# Patient Record
Sex: MALE | Race: BLACK OR AFRICAN AMERICAN | NOT HISPANIC OR LATINO | URBAN - METROPOLITAN AREA
[De-identification: names, ages, dates, MRNs, and addresses within clinical notes are randomized per-mention and may not be internally consistent; named-entity substitution may affect disease eponyms.]

---

## 2022-04-03 ENCOUNTER — EMERGENCY (EMERGENCY)
Facility: HOSPITAL | Age: 32
LOS: 0 days | Discharge: HOME | End: 2022-04-03
Payer: MEDICAID

## 2022-04-03 VITALS
OXYGEN SATURATION: 100 % | HEIGHT: 77 IN | DIASTOLIC BLOOD PRESSURE: 70 MMHG | RESPIRATION RATE: 19 BRPM | WEIGHT: 201.94 LBS | SYSTOLIC BLOOD PRESSURE: 116 MMHG | TEMPERATURE: 98 F | HEART RATE: 56 BPM
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
## 2022-04-03 DIAGNOSIS — S60.311A ABRASION OF RIGHT THUMB, INITIAL ENCOUNTER: ICD-10-CM
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
## 2022-04-03 PROCEDURE — 99282 EMERGENCY DEPT VISIT SF MDM: CPT

## 2022-04-03 NOTE — ED PROVIDER NOTE - CLINICAL SUMMARY MEDICAL DECISION MAKING FREE TEXT BOX
30 y/o M with right thumb abrasion, NAD, no signs of infections, tetanus UTD dc home to f/u with PMD.

## 2022-04-03 NOTE — ED ADULT NURSE NOTE - OBJECTIVE STATEMENT
Patient alert and oriented X 4, states he was cut by a piece of copper at his job, noted with an abrasion to anterior part of hand. patient had cellulitis in the past and states "I was nervous that this was cellulitis again." Denies fevers, chills, dizziness.

## 2022-04-03 NOTE — ED PROVIDER NOTE - PATIENT PORTAL LINK FT
You can access the FollowMyHealth Patient Portal offered by [facility] by registering at the following website: [email or web address]. By joining [employer]’s FollowMyHealth portal, you will also be able to view your health information using other applications (apps) compatible with our system.

## 2022-04-03 NOTE — ED PROVIDER NOTE - OBJECTIVE STATEMENT
30 y/o M with no PMHX presents to ED c/o having a small cut on right thumb and worries that it might be infected, pt stated that he cut it on a piece of copper no associated symptoms denies fever, numbness and other concerns.

## 2022-04-03 NOTE — ED PROVIDER NOTE - SKIN, MLM
Skin normal color for race, warm, dry and intact. No evidence of rash. tiny very superficial abrasion on left thumb no erythema, no swelling, non TTP no discharge noted FROM and strength

## 2022-04-03 NOTE — ED ADULT TRIAGE NOTE - CHIEF COMPLAINT QUOTE
Wednesday cut himself Right thumb on copper  hard, red, swelling noted  HX cellulitis  Unknown tetanus